# Patient Record
Sex: FEMALE
[De-identification: names, ages, dates, MRNs, and addresses within clinical notes are randomized per-mention and may not be internally consistent; named-entity substitution may affect disease eponyms.]

---

## 2019-07-23 ENCOUNTER — RX ONLY (RX ONLY)
Age: 44
End: 2019-07-23

## 2019-07-23 PROBLEM — D48.5 NEOPLASM OF UNCERTAIN BEHAVIOR OF SKIN: Status: ACTIVE | Noted: 2019-07-23

## 2020-02-24 ENCOUNTER — RX ONLY (RX ONLY)
Age: 45
End: 2020-02-24

## 2020-05-20 ENCOUNTER — RX ONLY (RX ONLY)
Age: 45
End: 2020-05-20

## 2020-07-22 PROBLEM — D48.5 NEOPLASM OF UNCERTAIN BEHAVIOR OF SKIN: Status: ACTIVE | Noted: 2020-07-22

## 2021-01-25 PROBLEM — D48.5 NEOPLASM OF UNCERTAIN BEHAVIOR OF SKIN: Status: ACTIVE | Noted: 2021-01-25

## 2021-02-01 PROBLEM — D48.5 NEOPLASM OF UNCERTAIN BEHAVIOR OF SKIN: Status: ACTIVE | Noted: 2021-02-01

## 2021-11-23 PROBLEM — D48.5 NEOPLASM OF UNCERTAIN BEHAVIOR OF SKIN: Status: ACTIVE | Noted: 2021-11-23

## 2021-12-07 ENCOUNTER — RX ONLY (RX ONLY)
Age: 46
End: 2021-12-07

## 2024-02-14 ENCOUNTER — OFFICE VISIT (OUTPATIENT)
Dept: FAMILY MEDICINE CLINIC | Age: 49
End: 2024-02-14

## 2024-02-14 VITALS
DIASTOLIC BLOOD PRESSURE: 88 MMHG | OXYGEN SATURATION: 100 % | WEIGHT: 102.6 LBS | SYSTOLIC BLOOD PRESSURE: 133 MMHG | HEART RATE: 83 BPM | HEIGHT: 61 IN | BODY MASS INDEX: 19.37 KG/M2

## 2024-02-14 DIAGNOSIS — Z13.220 SCREENING, LIPID: ICD-10-CM

## 2024-02-14 DIAGNOSIS — Z76.89 ENCOUNTER TO ESTABLISH CARE: ICD-10-CM

## 2024-02-14 DIAGNOSIS — Z13.1 SCREENING FOR DIABETES MELLITUS: ICD-10-CM

## 2024-02-14 DIAGNOSIS — E03.9 HYPOTHYROIDISM, UNSPECIFIED TYPE: ICD-10-CM

## 2024-02-14 DIAGNOSIS — R06.2 WHEEZING: ICD-10-CM

## 2024-02-14 DIAGNOSIS — N92.4 EXCESSIVE BLEEDING IN PREMENOPAUSAL PERIOD: ICD-10-CM

## 2024-02-14 DIAGNOSIS — Z00.00 ANNUAL PHYSICAL EXAM: Primary | ICD-10-CM

## 2024-02-14 DIAGNOSIS — Z13.6 SCREENING FOR CARDIOVASCULAR CONDITION: ICD-10-CM

## 2024-02-14 PROCEDURE — 99386 PREV VISIT NEW AGE 40-64: CPT | Performed by: STUDENT IN AN ORGANIZED HEALTH CARE EDUCATION/TRAINING PROGRAM

## 2024-02-14 RX ORDER — THYROID,PORK 90 MG
90 TABLET ORAL DAILY
COMMUNITY
Start: 2024-01-26

## 2024-02-14 RX ORDER — AZITHROMYCIN 250 MG/1
250 TABLET, FILM COATED ORAL SEE ADMIN INSTRUCTIONS
Qty: 6 TABLET | Refills: 0 | Status: SHIPPED | OUTPATIENT
Start: 2024-02-14 | End: 2024-02-19

## 2024-02-14 RX ORDER — DEXTROMETHORPHAN HYDROBROMIDE AND PROMETHAZINE HYDROCHLORIDE 15; 6.25 MG/5ML; MG/5ML
SYRUP ORAL
COMMUNITY
Start: 2024-02-08

## 2024-02-14 RX ORDER — METHYLPREDNISOLONE 4 MG/1
TABLET ORAL
Qty: 1 KIT | Refills: 0 | Status: SHIPPED | OUTPATIENT
Start: 2024-02-14 | End: 2024-02-20

## 2024-02-14 NOTE — PATIENT INSTRUCTIONS
Coricidin - Day    Nyquil/Umcka - Night    Demarco Singh MD    [address], [address]  Phone: [phone]     [email or web address]

## 2024-02-14 NOTE — PROGRESS NOTES
Edith Martinez is a 48 y.o. year old female here for:    Chief Complaint:    Chief Complaint   Patient presents with    New Patient     Establishing care     Cough     X started on February 3rd       Subjective:         HPI:       Patient presenting to establish care.  Primary concern is a cough that started earlier this month that has been persistent.  Received treatment previously, but did not resolve symptoms.  Other concerns include menorrhagia that has been present for the past several months.  Patient states that she previously had very light, regular periods prior to this.  She states that women in her family typically have a very prolonged perimenopausal state before menstrual cycle ceased.  States that she is interested in establishing with an OB/GYN, but would prefer establishing with one that is [facility] or is amenable to treating without using contraceptive medication.  Otherwise, she states that she feels well and has no other concerns.  Past medical history significant for hypothyroidism.  Currently taking [place] Thyroid.  Social history is benign.    Past Medical History:   Diagnosis Date    Hypothyroidism      Social History     Tobacco Use    Smoking status: Never    Smokeless tobacco: Never   Vaping Use    Vaping Use: Never used   Substance Use Topics    Alcohol use: Yes     Comment: occasionally    Drug use: Never     History reviewed. No pertinent family history.  History reviewed. No pertinent surgical history.      Current Outpatient Medications:     ARMOUR THYROID 90 MG tablet, Take 1 tablet by mouth daily, Disp: , Rfl:     promethazine-dextromethorphan (PROMETHAZINE-DM) 6.25-15 MG/5ML syrup, TAKE 5ML BY MOUTH EVERY 4 HOURS AS NEEDED, Disp: , Rfl:     methylPREDNISolone (MEDROL DOSEPACK) 4 MG tablet, Take by mouth., Disp: 1 kit, Rfl: 0    azithromycin (ZITHROMAX) 250 MG tablet, Take 1 tablet by mouth See Admin Instructions for 5 days 500mg on day 1 followed by 250mg on days 2 - 5, Disp: 6

## 2024-02-28 ENCOUNTER — TELEPHONE (OUTPATIENT)
Dept: FAMILY MEDICINE CLINIC | Age: 49
End: 2024-02-28

## 2024-02-28 DIAGNOSIS — D64.9 ANEMIA, UNSPECIFIED TYPE: Primary | ICD-10-CM

## 2024-02-28 RX ORDER — FERROUS SULFATE 325(65) MG
325 TABLET ORAL
Qty: 90 TABLET | Refills: 0 | Status: SHIPPED | OUTPATIENT
Start: 2024-02-28

## 2024-02-28 NOTE — TELEPHONE ENCOUNTER
Pt called to check to see if we have received her lab results that she had done yesterday. Labs are down in [facility]. Told patient we will call her once zofia reads them.     Please call patient on next steps for based on lab results

## 2024-02-28 NOTE — TELEPHONE ENCOUNTER
Discussed lab work with patient and options regarding treatment of her menorrhagia moving forward.  Plan would be to start progesterone supplementation approximately 2 weeks after the onset of her last period which was Friday the 23rd.  Supplementation would be 200 mg daily for 2 weeks.  Patient does have an appointment in early April with OB.  Patient does not want to use oral birth control pills due to conflicts with personal belief.  Discussed ER precautions with patient she verbalized understanding.  Also discussed that I will be sending in an iron supplement to help with her anemia and recommended a high-protein diet in the interim.  Advised her to notify our office early next week if she wants to move forward with this treatment plan.  Otherwise, we will await OB/GYN recommendations.    Humble Keen, DO

## 2024-04-10 ENCOUNTER — TELEPHONE (OUTPATIENT)
Dept: FAMILY MEDICINE CLINIC | Age: 49
End: 2024-04-10

## 2024-04-10 NOTE — TELEPHONE ENCOUNTER
Pt was having issues with sending a [employer] message to dr. Keen. Pt called the [employer] help desk and they told her to tell our office that Dr. Gudino has to send her a message first in order for her to be able to send him messages to him.

## 2024-04-16 ENCOUNTER — PATIENT MESSAGE (OUTPATIENT)
Dept: FAMILY MEDICINE CLINIC | Age: 49
End: 2024-04-16

## 2024-05-06 RX ORDER — THYROID,PORK 90 MG
90 TABLET ORAL DAILY
Qty: 30 TABLET | Refills: 0 | Status: SHIPPED | OUTPATIENT
Start: 2024-05-06

## 2024-05-06 NOTE — TELEPHONE ENCOUNTER
Medication:   Requested Prescriptions     Pending Prescriptions Disp Refills    JEANNIE THYROID 90 MG tablet 30 tablet 0     Sig: Take 1 tablet by mouth daily       Last Filled:  1/26/24    Patient Phone Number: [phone] (home)     Last appt: 2/14/2024   Next appt: Visit date not found    Last Thyroid: No results found for: \"TSH\", \"FT3\", \"A0TDLBA\", \"T4FREE\", \"Y7VZLKS\"

## 2024-05-06 NOTE — TELEPHONE ENCOUNTER
Medication and Quantity requested: [place] thyroid 90mg   Patient is out of medication  Last Visit  2-14-24,Dr. Keen    Pharmacy and phone number updated in [facility]:  yes,[facility]

## 2024-05-13 LAB
A/G RATIO: 1.9 RATIO (ref 0.8–2.6)
A/G RATIO: NORMAL RATIO (ref 0.8–2.6)
ALBUMIN: 4.6 G/DL (ref 3.5–5.2)
ALBUMIN: NORMAL G/DL (ref 3.5–5.2)
ALP BLD-CCNC: 66 U/L (ref 23–144)
ALP BLD-CCNC: NORMAL U/L (ref 23–144)
ALT SERPL-CCNC: 14 U/L (ref 0–60)
ALT SERPL-CCNC: NORMAL U/L (ref 0–60)
AST SERPL-CCNC: 21 U/L (ref 0–55)
AST SERPL-CCNC: NORMAL U/L (ref 0–55)
BASOPHILS ABSOLUTE: 0.1 K/UL (ref 0–0.3)
BASOPHILS RELATIVE PERCENT: 0.7 % (ref 0–2)
BILIRUB SERPL-MCNC: 0.2 MG/DL (ref 0–1.2)
BILIRUB SERPL-MCNC: NORMAL MG/DL (ref 0–1.2)
BUN / CREAT RATIO: 17 (ref 7–25)
BUN / CREAT RATIO: NORMAL (ref 7–25)
BUN BLDV-MCNC: 12 MG/DL (ref 3–29)
BUN BLDV-MCNC: NORMAL MG/DL (ref 3–29)
CALCIUM SERPL-MCNC: 9.7 MG/DL (ref 8.5–10.5)
CALCIUM SERPL-MCNC: NORMAL MG/DL (ref 8.5–10.5)
CHLORIDE BLD-SCNC: 104 MEQ/L (ref 96–110)
CHLORIDE BLD-SCNC: NORMAL MEQ/L (ref 96–110)
CHOLESTEROL, TOTAL: 175 MG/DL
CHOLESTEROL, TOTAL: NORMAL MG/DL
CO2: 24 MEQ/L (ref 19–32)
CO2: NORMAL MEQ/L (ref 19–32)
CREAT SERPL-MCNC: 0.7 MG/DL (ref 0.5–1.2)
CREAT SERPL-MCNC: NORMAL MG/DL (ref 0.5–1.2)
DIFFERENTIAL COUNT: NORMAL
DIFFERENTIAL COUNT: NORMAL
EOSINOPHILS ABSOLUTE: 0.1 K/UL (ref 0–0.5)
EOSINOPHILS RELATIVE PERCENT: 1.5 % (ref 0–5)
ESTIMATED GLOMERULAR FILTRATION RATE CREATININE EQUATION: 107 MLS/MIN/1.73M2
ESTIMATED GLOMERULAR FILTRATION RATE CREATININE EQUATION: NORMAL MLS/MIN/1.73M2
FASTING STATUS: NORMAL
FASTING STATUS: NORMAL
GLOBULIN: 2.4 G/DL (ref 1.9–3.6)
GLOBULIN: NORMAL G/DL (ref 1.9–3.6)
GLUCOSE BLD-MCNC: 85 MG/DL (ref 70–99)
GLUCOSE BLD-MCNC: NORMAL MG/DL (ref 70–99)
HBA1C MFR BLD: 4.8 % (ref 4–6)
HBA1C MFR BLD: NORMAL % (ref 4–6)
HCT VFR BLD CALC: 39.6 % (ref 34–49)
HCT VFR BLD CALC: NORMAL % (ref 34–49)
HDLC SERPL-MCNC: 85 MG/DL
HDLC SERPL-MCNC: NORMAL MG/DL
HEMOGLOBIN: 12.6 G/DL (ref 11.2–15.7)
HEMOGLOBIN: NORMAL G/DL (ref 11.2–15.7)
IMMATURE GRANS (ABS): 0 K/UL (ref 0–0.1)
IMMATURE GRANULOCYTES %: 0.2 %
LDL CHOLESTEROL: 82 MG/DL
LDL CHOLESTEROL: NORMAL MG/DL
LYMPHOCYTES ABSOLUTE: 2 K/UL (ref 0.9–4.1)
LYMPHOCYTES RELATIVE PERCENT: 23.4 % (ref 14–51)
MCH RBC QN AUTO: 27.9 PG (ref 26–34)
MCH RBC QN AUTO: NORMAL PG (ref 26–34)
MCHC RBC AUTO-ENTMCNC: 31.8 G/DL (ref 30.7–35.5)
MCHC RBC AUTO-ENTMCNC: NORMAL G/DL (ref 30.7–35.5)
MCV RBC AUTO: 87.6 FL (ref 80–100)
MCV RBC AUTO: NORMAL FL (ref 80–100)
MONOCYTES ABSOLUTE: 0.5 K/UL (ref 0.2–1)
MONOCYTES RELATIVE PERCENT: 6.3 % (ref 4–12)
NEUTROPHILS ABSOLUTE: 5.8 K/UL (ref 1.8–7.5)
NEUTROPHILS RELATIVE PERCENT: 67.9 % (ref 42–80)
PDW BLD-RTO: 13.5 %
PDW BLD-RTO: NORMAL %
PLATELET # BLD: 327 K/UL (ref 140–400)
PLATELET # BLD: NORMAL K/UL (ref 140–400)
PLATELET COMMENT: NORMAL
PMV BLD AUTO: 11.1 FL (ref 7.2–11.7)
PMV BLD AUTO: NORMAL FL (ref 7.2–11.7)
POTASSIUM SERPL-SCNC: 3.9 MEQ/L (ref 3.4–5.3)
POTASSIUM SERPL-SCNC: NORMAL MEQ/L (ref 3.4–5.3)
RBC # BLD: 4.52 M/UL (ref 3.95–5.26)
RBC # BLD: NORMAL M/UL (ref 3.95–5.26)
RBC COMMENT: NORMAL
RETICULOCYTE ABSOLUTE COUNT: 0 /100 WBC
SODIUM BLD-SCNC: 138 MEQ/L (ref 135–148)
SODIUM BLD-SCNC: NORMAL MEQ/L (ref 135–148)
TOTAL PROTEIN: 7 G/DL (ref 6–8.3)
TOTAL PROTEIN: NORMAL G/DL (ref 6–8.3)
TRIGL SERPL-MCNC: 40 MG/DL
TRIGL SERPL-MCNC: NORMAL MG/DL
VLDLC SERPL CALC-MCNC: 8 MG/DL (ref 4–32)
VLDLC SERPL CALC-MCNC: NORMAL MG/DL (ref 4–32)
WBC # BLD: 8.6 K/UL (ref 3.5–10.9)
WBC # BLD: NORMAL K/UL (ref 3.5–10.9)
WBC COMMENT: NORMAL

## 2024-06-03 RX ORDER — THYROID,PORK 90 MG
90 TABLET ORAL DAILY
Qty: 30 TABLET | Refills: 0 | OUTPATIENT
Start: 2024-06-03

## 2024-06-03 NOTE — TELEPHONE ENCOUNTER
Medication:   Requested Prescriptions     Pending Prescriptions Disp Refills    ARMOUR THYROID 90 MG tablet [Pharmacy Med Name: ARMOUR THYROID 90 MG TABLET] 30 tablet 0     Sig: TAKE 1 TABLET BY MOUTH EVERY DAY        Last Filled:  05/06/2024, 30, 0    Patient Phone Number: [phone] (home)     Last appt: 2/14/2024   Next appt: 6/3/2024    Last OARRS:        No data to display

## 2024-06-05 RX ORDER — THYROID,PORK 90 MG
90 TABLET ORAL DAILY
Qty: 30 TABLET | Refills: 0 | Status: SHIPPED | OUTPATIENT
Start: 2024-06-05 | End: 2024-07-01 | Stop reason: SDUPTHER

## 2024-06-05 NOTE — TELEPHONE ENCOUNTER
Pt called again to get refill on below medication. Pt is now completely out of medicine and will like it called in asap.    JEANNIE THYROID 90 MG tablet     [facility] to [id number] [place] [facility] [address]

## 2024-07-01 RX ORDER — THYROID,PORK 90 MG
90 TABLET ORAL DAILY
Qty: 30 TABLET | Refills: 0 | Status: SHIPPED | OUTPATIENT
Start: 2024-07-01

## 2024-07-01 NOTE — TELEPHONE ENCOUNTER
Medication:   Requested Prescriptions     Pending Prescriptions Disp Refills    JEANNIE THYROID 90 MG tablet 30 tablet 0     Sig: Take 1 tablet by mouth daily       Last Filled:      Patient Phone Number: [phone] (home)     Last appt: 2/14/2024

## 2024-08-02 RX ORDER — THYROID,PORK 90 MG
90 TABLET ORAL DAILY
Qty: 90 TABLET | Refills: 1 | Status: SHIPPED | OUTPATIENT
Start: 2024-08-02 | End: 2025-01-29

## 2024-08-02 NOTE — TELEPHONE ENCOUNTER
Medication:   Requested Prescriptions     Pending Prescriptions Disp Refills    JEANNIE THYROID 90 MG tablet 30 tablet 0     Sig: Take 1 tablet by mouth daily        Last Filled:  07.01.2024 30.0    Patient Phone Number: [phone] (home)     Last appt: 2/14/2024   Next appt: Visit date not found    Last OARRS:        No data to display

## 2024-08-15 ENCOUNTER — TELEPHONE (OUTPATIENT)
Dept: FAMILY MEDICINE CLINIC | Age: 49
End: 2024-08-15

## 2024-08-15 RX ORDER — PREDNISONE 20 MG/1
TABLET ORAL
Qty: 17 TABLET | Refills: 0 | Status: SHIPPED | OUTPATIENT
Start: 2024-08-15 | End: 2024-08-29

## 2024-08-15 RX ORDER — CLOBETASOL PROPIONATE 0.5 MG/G
OINTMENT TOPICAL
Qty: 30 G | Refills: 0 | Status: SHIPPED | OUTPATIENT
Start: 2024-08-15

## 2024-08-15 NOTE — TELEPHONE ENCOUNTER
Pt has had poison ivy for about 5 days.  She said the only area that is bothering her is back of knee, left leg.  This is itchy and swollen.  She is asking for advise on how to treat.  Pt is unable to be seen today.      [facility]

## 2024-08-20 ENCOUNTER — TELEPHONE (OUTPATIENT)
Dept: FAMILY MEDICINE CLINIC | Age: 49
End: 2024-08-20

## 2024-08-20 NOTE — TELEPHONE ENCOUNTER
Pt notified    [facility]:  I discussed with the patient the risks of imiquimod including but not limited to erythema, scaling, itching, weeping, crusting, and pain.  Patient understands that the inflammatory response to imiquimod is variable from person to person and was educated regarded proper titration schedule.  If flu-like symptoms develop, patient knows to discontinue the medication and contact us.

## 2024-08-20 NOTE — TELEPHONE ENCOUNTER
Patient was prescribed predniSONE (DELTASONE) 20 MG tablet [[id number]] .  '  Poison Corina appears to be dried up.  Patient want to know if she should continue taking the prednisone even though the rash has dried up.

## 2024-09-10 RX ORDER — CLOBETASOL PROPIONATE 0.5 MG/G
OINTMENT TOPICAL
Qty: 30 G | Refills: 0 | OUTPATIENT
Start: 2024-09-10

## 2024-11-08 LAB
T3 FREE: 6.8 PG/ML (ref 2.3–4.2)
T4 FREE: 1.32 NG/DL (ref 0.8–1.8)
TSH ULTRASENSITIVE: 0.25 MCIU/ML (ref 0.4–4.5)

## 2024-11-12 LAB — ZINC: 90 MCG/DL (ref 60–130)

## 2025-02-04 ENCOUNTER — PATIENT MESSAGE (OUTPATIENT)
Dept: FAMILY MEDICINE CLINIC | Age: 50
End: 2025-02-04

## 2025-02-04 DIAGNOSIS — E03.9 HYPOTHYROIDISM, UNSPECIFIED TYPE: Primary | ICD-10-CM

## 2025-02-08 LAB
T3 FREE: 4.5 PG/ML (ref 2.3–4.2)
T4 FREE: 1.07 NG/DL (ref 0.8–1.8)
TSH ULTRASENSITIVE: 0.68 MCIU/ML (ref 0.4–4.5)

## 2025-02-10 RX ORDER — THYROID 15 MG/1
15 TABLET ORAL DAILY
Qty: 90 TABLET | Refills: 1 | Status: SHIPPED | OUTPATIENT
Start: 2025-02-10

## 2025-02-10 RX ORDER — THYROID 60 MG/1
60 TABLET ORAL DAILY
Qty: 90 TABLET | Refills: 1 | Status: SHIPPED | OUTPATIENT
Start: 2025-02-10

## 2025-02-11 RX ORDER — THYROID,PORK 60 MG
60 TABLET ORAL DAILY
Qty: 90 TABLET | Refills: 0 | OUTPATIENT
Start: 2025-02-11

## 2025-02-11 NOTE — TELEPHONE ENCOUNTER
Medication:   Requested Prescriptions     Pending Prescriptions Disp Refills    JEANNIE THYROID 60 MG tablet [Pharmacy Med Name: JEANNIE THYROID 60 MG TABLET] 90 tablet 0     Sig: TAKE 1 TABLET BY MOUTH EVERY DAY       Last Filled:  02/10/2025    Patient Phone Number: [phone] (home)     Last appt: 2/14/2024   Next appt: Visit date not found    Last Thyroid:   Lab Results   Component Value Date/Time    TSH 0.676 02/07/2025 12:28 PM    FT3 4.5 02/07/2025 12:28 PM    T4FREE 1.07 02/07/2025 12:28 PM

## 2025-08-06 RX ORDER — THYROID 60 MG/1
60 TABLET ORAL DAILY
Qty: 90 TABLET | Refills: 1 | Status: SHIPPED | OUTPATIENT
Start: 2025-08-06

## 2025-08-06 RX ORDER — THYROID 15 MG/1
15 TABLET ORAL DAILY
Qty: 90 TABLET | Refills: 1 | Status: SHIPPED | OUTPATIENT
Start: 2025-08-06